# Patient Record
Sex: FEMALE | Race: WHITE
[De-identification: names, ages, dates, MRNs, and addresses within clinical notes are randomized per-mention and may not be internally consistent; named-entity substitution may affect disease eponyms.]

---

## 2017-01-09 ENCOUNTER — HOSPITAL ENCOUNTER (OUTPATIENT)
Dept: HOSPITAL 45 - C.GI | Age: 62
Discharge: HOME | End: 2017-01-09
Attending: INTERNAL MEDICINE
Payer: COMMERCIAL

## 2017-01-09 VITALS
BODY MASS INDEX: 41.01 KG/M2 | BODY MASS INDEX: 41.01 KG/M2 | WEIGHT: 270.57 LBS | HEIGHT: 67.99 IN | HEIGHT: 67.99 IN | WEIGHT: 270.57 LBS

## 2017-01-09 VITALS — TEMPERATURE: 98.24 F

## 2017-01-09 VITALS — SYSTOLIC BLOOD PRESSURE: 144 MMHG | OXYGEN SATURATION: 95 % | DIASTOLIC BLOOD PRESSURE: 75 MMHG | HEART RATE: 59 BPM

## 2017-01-09 DIAGNOSIS — E66.9: ICD-10-CM

## 2017-01-09 DIAGNOSIS — G47.33: ICD-10-CM

## 2017-01-09 DIAGNOSIS — I10: ICD-10-CM

## 2017-01-09 DIAGNOSIS — E11.9: ICD-10-CM

## 2017-01-09 DIAGNOSIS — K64.4: ICD-10-CM

## 2017-01-09 DIAGNOSIS — D12.0: ICD-10-CM

## 2017-01-09 DIAGNOSIS — K64.8: ICD-10-CM

## 2017-01-09 DIAGNOSIS — Z12.11: Primary | ICD-10-CM

## 2017-01-09 DIAGNOSIS — K57.30: ICD-10-CM

## 2017-01-09 NOTE — GI REPORT
Procedure Date: 1/9/2017 11:27 AM

Procedure:            Colonoscopy

Indications:          Screening for colorectal malignant neoplasm

Medicines:            Monitored Anesthesia Care

Complications:        No immediate complications.

Estimated Blood Loss: Estimated blood loss: none.

Procedure:            Pre-Anesthesia Assessment:

                      - Prior to the procedure, a History and Physical was 

                      performed, and patient medications and allergies were 

                      reviewed. The patient's tolerance of previous 

                      anesthesia was also reviewed. The risks and benefits of 

                      the procedure and the sedation options and risks were 

                      discussed with the patient. All questions were 

                      answered, and informed consent was obtained. Prior 

                      Anticoagulants: The patient has taken no previous 

                      anticoagulant or antiplatelet agents. ASA Grade 

                      Assessment: II - A patient with mild systemic disease. 

                      After reviewing the risks and benefits, the patient was 

                      deemed in satisfactory condition to undergo the 

                      procedure.

                      After I obtained informed consent, the scope was passed 

                      under direct vision. Throughout the procedure, the 

                      patient's blood pressure, pulse, and oxygen saturations 

                      were monitored continuously. The On-site loaner was 

                      introduced through the anus and advanced to the 

                      terminal ileum. The colonoscopy was performed without 

                      difficulty. The patient tolerated the procedure well. 

                      The quality of the bowel preparation was good. The 

                      terminal ileum, ileocecal valve, appendiceal orifice, 

                      and rectum were photographed.

Findings:

     A 4 mm polyp was found in the cecum. The polyp was sessile. The polyp 

     was removed with a cold snare. Resection and retrieval were complete.

     Multiple small-mouthed diverticula were found in the sigmoid colon.

     Non-bleeding external and internal hemorrhoids were found during 

     retroflexion and during perianal exam. The hemorrhoids were small.

Impression:           - One 4 mm polyp in the cecum, removed with a cold 

                      snare. Resected and retrieved.

                      - Diverticulosis in the sigmoid colon.

                      - Non-bleeding external and internal hemorrhoids.

Recommendation:       - Resume previous diet.

                      - Continue present medications.

                      - Repeat colonoscopy for surveillance based on 

                      pathology results.

                      - Return to primary care physician as previously 

                      scheduled.

Alex Davidson, DO

1/9/2017 12:11:06 PM

This report has been signed electronically.

Note Initiated On: 1/9/2017 11:27 AM

## 2017-01-09 NOTE — DISCHARGE INSTRUCTIONS
Endoscopy Patient Instructions


Date / Procedure(s) Performed


Jan 9, 2017.


Colonoscopy





Allergy Information


Coded Allergies:  


     NO KNOWN DRUG ALLERGIES (Verified  Allergy, Unknown, ., 12/22/16)





Discharge Date / Findings


Jan 9, 2017.


Colon polyp


Diverticulosis


Internal hemorrhoid





Medication Instructions


Stopped Medication(s):  


stopped all supplements,metformin,HCTZ/Lisinopril on Friday


OK to resume all medications today as prescribed.


 Reported Home Medications








 Medications  Dose


 Route/Sig


 Max Daily Dose Days Date Category


 


 [Magnesium


 Malate]  1-2 Tab


 PO QPM


    12/22/16 Reported


 


 Osteo Bi-Flex


 Advanced Do (Misc


 Natural Products)


 1 Tab Tab  1 Tab


 PO QPM


    12/22/16 Reported


 


 Omega-3 (Fish


 Oil) 1 Ea Cap  1 Cap


 PO QPM


    12/22/16 Reported


 


 Glucophage


  (Metformin Hcl)


 500 Mg Tab  2 Tab


 PO QAM


    12/22/16 Reported


 


 Glucophage


  (Metformin Hcl)


 500 Mg Tab  500 Mg


 PO QPM


    12/22/16 Reported


 


 Vitamin D3


  (Cholecalciferol)


 1,000 Unit Cap  1 Cap


 PO QAM


    12/22/16 Reported


 


 Vitamin C 500 mg


  (Ascorbic Acid) 1


 Chw Chw  1 Tab


 PO QAM


    12/22/16 Reported


 


 Multivitamin


  (Multivitamins)


 Tab  1 Tab


 PO QAM


    12/22/16 Reported


 


 Lisinopril/Hctz


 10/12.5 Mg


  (HCTZ/Lisinopril)


 1 Ea Tab  1 Tab


 PO QAM


    12/22/16 Reported


 


 Levothyroxine


 Sodium 150 Mcg Tab  1 Tab


 PO QAM


   90 12/22/16 Reported











Provider Instructions





Activity Restrictions





-  No exercising or heavy lifting for 24 hours. 


-  Do not drink alcohol the day of the procedure.


-  Do not drive a car or operate machinery until the day after the procedure.


-  Do not make any important decisions or sign important papers in 24 hours 

after the procedure.





Following Day:





-  Return to full activity which may include returning to work/school.





Diet





Start your diet with liquids and light foods (jello, soup, juice, toast).  Then 

eat your usual diet if not nauseated.





Treatment For Common After Affects





For mild abdominal pain, bloating, or excessive gas:





-  Rest


-  Eat lightly


-  Lie on right side





Follow-Up Information


Follow-up with Negra WILSON as scheduled





Anesthesia Information





What You Should Know





You have had a procedure that required some medicine to reduce anxiety and 

discomfort. This treatment is called moderate sedation.  


After receiving the treatment, you may be sleepy, but you will be able to 

breathe on your own.  The effects of the treatment may last for several hours.








Follow these instructions along with Activity/Diet recommendations noted above:





*  Do NOT do anything where dizziness or clumsiness would be dangerous.





*  Rest quietly at home today, then you can be up and about tomorrow.





*  Have a responsible person stay with you the rest of today.





*  You may have had an I.V. today.  If so, you may take the dressing off later 

today.





Recommendations


 


Call your doctor if:





*  Trouble breathing 





*  Continuous vomiting for more than 24 hours





*  Temperature above 101 degrees





*  Severe abdominal pain or bloating





*  Pain not relieved by pain medicine ordered





*  There is increased drainage or redness from any incision





*  A large amount of rectal bleeding greater than 2-3 tablespoons. 


   (If you had a polyp/s removed or have hemorrhoids, a small amount of blood -


    from the rectum is to be expected.)





*  You have any unanswered questions or concerns.





IN THE EVENT OF A SERIOUS EMERGENCY, GO TO THE NEAREST EMERGENCY ROOM





       Your discharge instructions were prepared by provider Alex Davidson.


 Patient Instructions Signature Page








Libby Cary 











Patient (or Guardian) Signature/Date:____________________________________ I 

have read and understand the instructions given to me by my caregivers.








Caregiver/RN/Doctor Signature/Date:____________________________________








The above-named patient and/or guardian has received patient instructions on 

this date.


























+  Original Patient Signature Page (only) stays with chart.  Please make copy 

for patient.

## 2017-01-09 NOTE — ANESTHESIOLOGY PROGRESS NOTE
Anesthesia Post Op Note


Date & Time


Jan 9, 2017 at 12:31





Vital Signs


Pain Intensity:  0





 Vital Signs Past 12 Hours








  Date Time  Temp Pulse Resp B/P Pulse Ox O2 Delivery O2 Flow Rate FiO2


 


1/9/17 12:22  63 20 147/65 97 Room Air  


 


1/9/17 12:06  63 20 92/66 96 Room Air  


 


1/9/17 10:52 36.8 78 20 199/96 98 Room Air  











Notes


Mental Status:  alert / awake / arousable, participated in evaluation


Pt Amnestic to Procedure:  Yes


Nausea / Vomiting:  adequately controlled


Pain:  adequately controlled


Airway Patency, RR, SpO2:  stable & adequate


BP & HR:  stable & adequate


Hydration State:  stable & adequate


Anesthetic Complications:  no major complications apparent

## 2017-01-09 NOTE — ENDO HISTORY AND PHYSICAL
History & Physical


Date of Service:


Jan 9, 2017.


Chief Complaint:


screening


Referring Physician:


Negra WILSON


History of Present Illness


60 yo CF who presents for screening colonoscopy.





Past Surgical History


Hx Cardiac Surgery:  No


Hx Internal Defibrillator:  No


Hx Pacemaker:  No


Hx Abdominal Surgery:  Yes (TAMAR BSO)


Hx of Implantable Prosthesis:  No


Hx Post-Op Nausea and Vomiting:  No


Hx Cancer Surgery:  No


Hx Thoracic Surgery:  No


Hx Orthopedic:  No


Hx Urinary Tract Surgery:  No





Family History


None





Social History


Smoking Status:  Never Smoker


Hx Substance Use:  No


Hx Alcohol Use:  Yes (RARELY)





Allergies


Coded Allergies:  


     NO KNOWN DRUG ALLERGIES (Verified  Allergy, Unknown, ., 12/22/16)





Current Medications





 Reported Home Medications








 Medications  Dose


 Route/Sig


 Max Daily Dose Days Date Category


 


 [Magnesium


 Malate]  1-2 Tab


 PO QPM


    12/22/16 Reported


 


 Osteo Bi-Flex


 Advanced Do (Misc


 Natural Products)


 1 Tab Tab  1 Tab


 PO QPM


    12/22/16 Reported


 


 Omega-3 (Fish


 Oil) 1 Ea Cap  1 Cap


 PO QPM


    12/22/16 Reported


 


 Glucophage


  (Metformin Hcl)


 500 Mg Tab  2 Tab


 PO QAM


    12/22/16 Reported


 


 Glucophage


  (Metformin Hcl)


 500 Mg Tab  500 Mg


 PO QPM


    12/22/16 Reported


 


 Vitamin D3


  (Cholecalciferol)


 1,000 Unit Cap  1 Cap


 PO QAM


    12/22/16 Reported


 


 Vitamin C 500 mg


  (Ascorbic Acid) 1


 Chw Chw  1 Tab


 PO QAM


    12/22/16 Reported


 


 Multivitamin


  (Multivitamins)


 Tab  1 Tab


 PO QAM


    12/22/16 Reported


 


 Lisinopril/Hctz


 10/12.5 Mg


  (HCTZ/Lisinopril)


 1 Ea Tab  1 Tab


 PO QAM


    12/22/16 Reported


 


 Levothyroxine


 Sodium 150 Mcg Tab  1 Tab


 PO QAM


   90 12/22/16 Reported











Vital Signs


Weight (Kilograms):  122.73


Height (Feet):  5


Height (Inches):  8





Physical Exam


General Appearance:  WD/WN, no apparent distress


Respiratory/Chest:  


   Auscultation:  breath sounds normal


Cardiovascular:  


   Heart Auscultation:  RRR


Abdomen:  


   Bowel Sounds:  normal


   Inspection & Palpation:  soft, non-distended, no tenderness, guarding & 

rebound





Assessment and Plan


Assessment:


60 yo CF who presents for screening colonoscopy.








Plan:


Proceed with colonoscopy.

## 2017-07-26 ENCOUNTER — HOSPITAL ENCOUNTER (OUTPATIENT)
Dept: HOSPITAL 45 - C.MAMM | Age: 62
Discharge: HOME | End: 2017-07-26
Attending: OBSTETRICS & GYNECOLOGY
Payer: COMMERCIAL

## 2017-07-26 DIAGNOSIS — Z12.31: Primary | ICD-10-CM

## 2017-07-27 NOTE — MAMMOGRAPHY REPORT
BILATERAL DIGITAL SCREENING MAMMOGRAM TOMOSYNTHESIS WITH CAD: 7/26/2017

CLINICAL HISTORY: Routine screening.  Patient has no complaints.  





TECHNIQUE:  Breast tomosynthesis in addition to standard 2D mammography was performed. Current study 
was also evaluated with a Computer Aided Detection (CAD) system.  



COMPARISON: Comparison is made to exams dated:  7/25/2016 mammogram, 7/22/2015 mammogram, 7/18/2014 m
ammogram, 7/10/2013 mammogram, 7/3/2012 mammogram - [facility], and 6/15/2009.   



BREAST COMPOSITION:  There are scattered areas of fibroglandular density in both breasts.  



FINDINGS:  The parenchymal pattern is unchanged. No developing mass, architectural distortion or clus
ter of suspicious microcalcifications is seen in either breast.  





IMPRESSION:  ACR BI-RADS CATEGORY 2: BENIGN

There is no mammographic evidence of malignancy. A 1 year screening mammogram is recommended.  The pa
tient will receive written notification of the results.  





Approximately 10% of breast cancers are not detected with mammography. A negative mammographic report
 should not delay biopsy if a clinically suggestive mass is present.



Odessa Baeza M.D.          

ay/:7/26/2017 12:50:15  



Imaging Technologist: Elaina Gill, [facility]

letter sent: Normal 1/2  

BI-RADS Code: ACR BI-RADS Category 2: Benign

## 2017-09-21 ENCOUNTER — HOSPITAL ENCOUNTER (EMERGENCY)
Dept: HOSPITAL 45 - C.EDB | Age: 62
Discharge: HOME | End: 2017-09-21
Payer: COMMERCIAL

## 2017-09-21 VITALS — TEMPERATURE: 98.42 F

## 2017-09-21 VITALS
WEIGHT: 278.88 LBS | BODY MASS INDEX: 42.27 KG/M2 | WEIGHT: 278.88 LBS | HEIGHT: 67.99 IN | BODY MASS INDEX: 42.27 KG/M2 | HEIGHT: 67.99 IN

## 2017-09-21 VITALS — DIASTOLIC BLOOD PRESSURE: 77 MMHG | HEART RATE: 85 BPM | SYSTOLIC BLOOD PRESSURE: 130 MMHG | OXYGEN SATURATION: 99 %
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
## 2017-09-21 DIAGNOSIS — Z79.899: ICD-10-CM

## 2017-09-21 DIAGNOSIS — E03.9: ICD-10-CM

## 2017-09-21 DIAGNOSIS — Z79.84: ICD-10-CM

## 2017-09-21 DIAGNOSIS — Z23: ICD-10-CM

## 2017-09-21 DIAGNOSIS — S61.312A: Primary | ICD-10-CM

## 2017-09-21 NOTE — EMERGENCY ROOM VISIT NOTE
ED Visit Note


First contact with patient:  15:00


CHIEF COMPLAINT: Finger laceration





HISTORY OF PRESENT ILLNESS: This 61-year-old female patient presents to the 

emergency department ambulatory after cutting the right third finger when she 

was using hedge clippers and accidentally cut her finger. The bleeding has not 

stopped. Denies weakness or numbness of the finger. The patient has full range 

of motion of the fingers. The patient denies any pain. The patient denies any 

other injuries. The patient's tetanus shot is not up to date.





REVIEW OF SYSTEMS: A 6 system review of systems was completed with positives 

and pertinent negatives listed in the HPI. 





ALLERGIES: No known drug allergies





MEDICATIONS: Thyroid medication





PMH: Hypothyroid





SOCIAL HISTORY: The patient lives locally





PHYSICAL EXAM: Vital Signs: Reviewed Nurse's notes, vital signs stable. GENERAL

: This is 61-year-old female, in no acute distress, well developed, well 

nourished.  SKIN:  There is a 2 cm long flap-like laceration on the distal 

aspect of the right third finger it does involve the nail. The edges gape apart 

with traction. There is no foreign material in the wound and it looks clean. 

There is minimal bleeding. No deep structures such as tendons, bones, or nerves 

are seen in the base of the wound. Extension and flexion of the finger is full 

and strong. Full range of motion of the wrist and other fingers. Capillary 

refill less than 2 seconds. Normal sensation to light and sharp touch.





EMERGENCY DEPARTMENT COURSE: I examined the patient.  Using sterile technique 

the wound was cleaned with Betadine.  6 ml of 1% buffered lidocaine was used to 

perform a digital block to anesthetize the patient.  The area was sterilely 

draped.  Once the patient was numb, the wound was copiously irrigated under 

pressure with sterile saline.  The wound was explored and there were no deep 

structures such as tendons, bone, or ligaments present.  The laceration was 

repaired using 3  simple interrupted 5-0 nylon sutures.  The patient tolerated 

the procedure well.  The bleeding stopped.  The area was cleaned with sterile 

saline and dressed with bacitracin ointment and bandage.   The patient was 

given a tetanus booster.  The patient was discharged home in good condition.





DIAGNOSIS: Finger laceration





DISCHARGE INSTRUCTIONS & TREATMENT: Keep wound clean and dry.  Do not allow any 

crusting or dried blood to accumulate on sutures.  If this occurs, use a 1:1 

solution of hydrogen peroxide/water on a Q-tip to clean the wound.  Use an 

antibiotic ointment for 3-4 days, then let wound dry.  Suture removal in 10-12 

days.  Return sooner for any signs of infection (increasing redness, swelling, 

drainage).  Ice and elevate for swelling and pain.  Ibuprofen 600 mg  every 6 

hrs for pain.  Keep covered when in sun until sutures removed then SPF 50 or 

higher for one year.  Vitamin E oil if desired two weeks after suture removal 

for reduction of scar.


Current/Historical Medications


Scheduled


Ascorbic Acid (Vitamin C 500 mg), 1 TAB PO QAM


Cholecalciferol (Vitamin D3), 1 CAP PO QAM


Fish Oil (Hartman-3), 1 CAP PO QPM


Hctz/Lisinopril (Lisinopril/Hctz 10/12.5 Mg), 1 TAB PO QAM


Levothyroxine Sodium (Levothyroxine Sodium), 100 MCG PO DAILY


Magnesium Malate (Magnesium Malate), 1-2 TABS PO QPM


Metformin Hcl (Glucophage), 500 MG PO QPM


Metformin Hcl (Glucophage), 2 TAB PO QAM


Misc Natural Products (Osteo Bi-Flex Advanced Do), 1 TAB PO QPM


Multivitamin (Multivitamin), 1 TAB PO QAM


Omeprazole (Prilosec), 20 MG PO DAILY





Allergies


Coded Allergies:  


     NO KNOWN DRUG ALLERGIES (Verified  Allergy, Unknown, ., 12/22/16)





Vital Signs











  Date Time  Temp Pulse Resp B/P (MAP) Pulse Ox O2 Delivery O2 Flow Rate FiO2


 


9/21/17 16:11  85 18 130/77 99   


 


9/21/17 14:39 36.9 85 15 124/77 97 Room Air  











Medications Administered











 Medications


  (Trade)  Dose


 Ordered  Sig/Ric


 Route  Start Time


 Stop Time Status Last Admin


Dose Admin


 


 Diphtheria/


 Pertussis/Tetanus


 Vacc


  (Adacel Inj)  0.5 ml  ONCE ONCE


 IM.  9/21/17 15:30


 9/21/17 15:31 DC 9/21/17 15:47


0.5 ML











Departure Information


Impression





 Primary Impression:  


 Laceration





Dispostion


Home / Self-Care





Condition


GOOD





Referrals


Negra Zendejas C.R.N.P. (PCP)





Patient Instructions


ED Laceration All, My [facility]





Additional Instructions





 Keep wound clean and dry.  Do not allow any crusting or dried blood to 

accumulate on sutures.  If this occurs, use a 1:1 solution of hydrogen peroxide/

water on a Q-tip to clean the wound.  Use an antibiotic ointment for 3-4 days, 

then let wound dry.  Suture removal in 10-12 days.  Return sooner for any signs 

of infection (increasing redness, swelling, drainage).  Ice and elevate for 

swelling and pain.  Ibuprofen 600 mg  every 6 hrs for pain.  Keep covered when 

in sun until sutures removed then SPF 50 or higher for one year.  Vitamin E oil 

if desired two weeks after suture removal for reduction of scar.

## 2018-07-31 ENCOUNTER — HOSPITAL ENCOUNTER (OUTPATIENT)
Dept: HOSPITAL 45 - C.MAMM | Age: 63
Discharge: HOME | End: 2018-07-31
Attending: OBSTETRICS & GYNECOLOGY
Payer: COMMERCIAL

## 2018-07-31 DIAGNOSIS — Z12.31: Primary | ICD-10-CM
